# Patient Record
Sex: FEMALE | URBAN - METROPOLITAN AREA
[De-identification: names, ages, dates, MRNs, and addresses within clinical notes are randomized per-mention and may not be internally consistent; named-entity substitution may affect disease eponyms.]

---

## 2019-07-30 ENCOUNTER — OFFICE VISIT (OUTPATIENT)
Dept: URBAN - METROPOLITAN AREA CLINIC 76 | Facility: CLINIC | Age: 54
End: 2019-07-30

## 2019-07-30 DIAGNOSIS — H40.023 OPEN ANGLE WITH BORDERLINE FINDINGS, HIGH RISK, BILATERAL: Primary | ICD-10-CM

## 2019-07-30 PROCEDURE — 92020 GONIOSCOPY: CPT | Performed by: OPHTHALMOLOGY

## 2019-07-30 PROCEDURE — 92004 COMPRE OPH EXAM NEW PT 1/>: CPT | Performed by: OPHTHALMOLOGY

## 2019-07-30 ASSESSMENT — INTRAOCULAR PRESSURE
OS: 16
OD: 16

## 2019-07-30 NOTE — IMPRESSION/PLAN
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 IOP OU ok today. need to check diurnal variation. Plan: Discussed diagnosis in detail with patient. Will request records and test from Dr Devaughn Canchola.  may need to consider starting treatment

## 2019-08-29 ENCOUNTER — OFFICE VISIT (OUTPATIENT)
Dept: URBAN - METROPOLITAN AREA CLINIC 76 | Facility: CLINIC | Age: 54
End: 2019-08-29

## 2019-08-29 PROCEDURE — 99214 OFFICE O/P EST MOD 30 MIN: CPT | Performed by: OPHTHALMOLOGY

## 2019-08-29 ASSESSMENT — INTRAOCULAR PRESSURE
OS: 16
OD: 16

## 2019-08-30 NOTE — IMPRESSION/PLAN
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 IOP OU in good range. no significant diurnal variation. VF and OCT reviewed. VF OS with questionable changes, ON appearance reassuring at this time. Target: Plan: OK to monitor without treatment at this time. Need to confirm VF changes OS as well as elevated iop by [facility] before recommending treatment. Repeat OS VF in 6 month. Ok to check IOP w/ [facility] but recommend IOP be rechecked w/ [facility] for comparison. If VF changes confirmed and/or elevated iop confirmed, recommend treatment (latanoprost).